# Patient Record
Sex: MALE | Race: WHITE | NOT HISPANIC OR LATINO | Employment: OTHER | URBAN - METROPOLITAN AREA
[De-identification: names, ages, dates, MRNs, and addresses within clinical notes are randomized per-mention and may not be internally consistent; named-entity substitution may affect disease eponyms.]

---

## 2021-06-06 PROCEDURE — 87635 SARS-COV-2 COVID-19 AMP PRB: CPT | Performed by: EMERGENCY MEDICINE

## 2021-06-12 ENCOUNTER — HOSPITAL ENCOUNTER (EMERGENCY)
Facility: HOSPITAL | Age: 26
Discharge: HOME | End: 2021-06-12
Attending: EMERGENCY MEDICINE | Admitting: EMERGENCY MEDICINE

## 2021-06-12 ENCOUNTER — APPOINTMENT (OUTPATIENT)
Dept: GENERAL RADIOLOGY | Facility: HOSPITAL | Age: 26
End: 2021-06-12

## 2021-06-12 VITALS
RESPIRATION RATE: 16 BRPM | HEIGHT: 74 IN | WEIGHT: 170.19 LBS | BODY MASS INDEX: 21.84 KG/M2 | SYSTOLIC BLOOD PRESSURE: 110 MMHG | OXYGEN SATURATION: 100 % | DIASTOLIC BLOOD PRESSURE: 73 MMHG | HEART RATE: 80 BPM | TEMPERATURE: 98.2 F

## 2021-06-12 DIAGNOSIS — J10.1 INFLUENZA B: Primary | ICD-10-CM

## 2021-06-12 DIAGNOSIS — Z20.822 COVID-19 VIRUS TEST RESULT UNKNOWN: ICD-10-CM

## 2021-06-12 LAB
ALBUMIN SERPL-MCNC: 4.4 G/DL (ref 3.5–5.2)
ALBUMIN/GLOB SERPL: 1.8 G/DL
ALP SERPL-CCNC: 77 U/L (ref 39–117)
ALT SERPL W P-5'-P-CCNC: 16 U/L (ref 1–41)
ANION GAP SERPL CALCULATED.3IONS-SCNC: 7.6 MMOL/L (ref 5–15)
AST SERPL-CCNC: 13 U/L (ref 1–40)
BASOPHILS # BLD AUTO: 0.02 10*3/MM3 (ref 0–0.2)
BASOPHILS NFR BLD AUTO: 0.3 % (ref 0–1.5)
BILIRUB SERPL-MCNC: 0.5 MG/DL (ref 0–1.2)
BUN SERPL-MCNC: 10 MG/DL (ref 6–20)
BUN/CREAT SERPL: 11.6 (ref 7–25)
CALCIUM SPEC-SCNC: 8.8 MG/DL (ref 8.6–10.5)
CHLORIDE SERPL-SCNC: 99 MMOL/L (ref 98–107)
CO2 SERPL-SCNC: 29.4 MMOL/L (ref 22–29)
CREAT SERPL-MCNC: 0.86 MG/DL (ref 0.76–1.27)
DEPRECATED RDW RBC AUTO: 39.5 FL (ref 37–54)
EOSINOPHIL # BLD AUTO: 0.13 10*3/MM3 (ref 0–0.4)
EOSINOPHIL NFR BLD AUTO: 1.9 % (ref 0.3–6.2)
ERYTHROCYTE [DISTWIDTH] IN BLOOD BY AUTOMATED COUNT: 12.3 % (ref 12.3–15.4)
FLUAV AG NPH QL: NEGATIVE
FLUBV AG NPH QL IA: POSITIVE
GFR SERPL CREATININE-BSD FRML MDRD: 109 ML/MIN/1.73
GLOBULIN UR ELPH-MCNC: 2.5 GM/DL
GLUCOSE SERPL-MCNC: 68 MG/DL (ref 65–99)
HCT VFR BLD AUTO: 42.3 % (ref 37.5–51)
HGB BLD-MCNC: 14.1 G/DL (ref 13–17.7)
HOLD SPECIMEN: NORMAL
HOLD SPECIMEN: NORMAL
IMM GRANULOCYTES # BLD AUTO: 0.06 10*3/MM3 (ref 0–0.05)
IMM GRANULOCYTES NFR BLD AUTO: 0.9 % (ref 0–0.5)
LYMPHOCYTES # BLD AUTO: 2.54 10*3/MM3 (ref 0.7–3.1)
LYMPHOCYTES NFR BLD AUTO: 37.6 % (ref 19.6–45.3)
MCH RBC QN AUTO: 29.4 PG (ref 26.6–33)
MCHC RBC AUTO-ENTMCNC: 33.3 G/DL (ref 31.5–35.7)
MCV RBC AUTO: 88.1 FL (ref 79–97)
MONOCYTES # BLD AUTO: 0.38 10*3/MM3 (ref 0.1–0.9)
MONOCYTES NFR BLD AUTO: 5.6 % (ref 5–12)
NEUTROPHILS NFR BLD AUTO: 3.62 10*3/MM3 (ref 1.7–7)
NEUTROPHILS NFR BLD AUTO: 53.7 % (ref 42.7–76)
NRBC BLD AUTO-RTO: 0 /100 WBC (ref 0–0.2)
NT-PROBNP SERPL-MCNC: 14.3 PG/ML (ref 0–450)
PLATELET # BLD AUTO: 300 10*3/MM3 (ref 140–450)
PMV BLD AUTO: 9.4 FL (ref 6–12)
POTASSIUM SERPL-SCNC: 4 MMOL/L (ref 3.5–5.2)
PROT SERPL-MCNC: 6.9 G/DL (ref 6–8.5)
RBC # BLD AUTO: 4.8 10*6/MM3 (ref 4.14–5.8)
SODIUM SERPL-SCNC: 136 MMOL/L (ref 136–145)
TROPONIN T SERPL-MCNC: <0.01 NG/ML (ref 0–0.03)
WBC # BLD AUTO: 6.75 10*3/MM3 (ref 3.4–10.8)
WHOLE BLOOD HOLD SPECIMEN: NORMAL

## 2021-06-12 PROCEDURE — 84484 ASSAY OF TROPONIN QUANT: CPT

## 2021-06-12 PROCEDURE — 80053 COMPREHEN METABOLIC PANEL: CPT

## 2021-06-12 PROCEDURE — 93005 ELECTROCARDIOGRAM TRACING: CPT | Performed by: EMERGENCY MEDICINE

## 2021-06-12 PROCEDURE — 99283 EMERGENCY DEPT VISIT LOW MDM: CPT

## 2021-06-12 PROCEDURE — 93005 ELECTROCARDIOGRAM TRACING: CPT

## 2021-06-12 PROCEDURE — U0003 INFECTIOUS AGENT DETECTION BY NUCLEIC ACID (DNA OR RNA); SEVERE ACUTE RESPIRATORY SYNDROME CORONAVIRUS 2 (SARS-COV-2) (CORONAVIRUS DISEASE [COVID-19]), AMPLIFIED PROBE TECHNIQUE, MAKING USE OF HIGH THROUGHPUT TECHNOLOGIES AS DESCRIBED BY CMS-2020-01-R: HCPCS | Performed by: NURSE PRACTITIONER

## 2021-06-12 PROCEDURE — 85025 COMPLETE CBC W/AUTO DIFF WBC: CPT

## 2021-06-12 PROCEDURE — 83880 ASSAY OF NATRIURETIC PEPTIDE: CPT

## 2021-06-12 PROCEDURE — 36415 COLL VENOUS BLD VENIPUNCTURE: CPT

## 2021-06-12 PROCEDURE — 87804 INFLUENZA ASSAY W/OPTIC: CPT | Performed by: NURSE PRACTITIONER

## 2021-06-12 PROCEDURE — C9803 HOPD COVID-19 SPEC COLLECT: HCPCS | Performed by: NURSE PRACTITIONER

## 2021-06-12 PROCEDURE — 71045 X-RAY EXAM CHEST 1 VIEW: CPT

## 2021-06-12 RX ORDER — SODIUM CHLORIDE 0.9 % (FLUSH) 0.9 %
10 SYRINGE (ML) INJECTION AS NEEDED
Status: DISCONTINUED | OUTPATIENT
Start: 2021-06-12 | End: 2021-06-12 | Stop reason: HOSPADM

## 2021-06-12 NOTE — ED PROVIDER NOTES
"Subjective   Patient reports that he has had shortness of breath and difficulty breathing with chest pain described as a \"bear hug\".  He reports that he went to an urgent care clinic on June 5 and was diagnosed with a sinus infection and prescribed cefdinir and prednisone.  He reports that it did fix his hoarseness but he seems like he feels worse than he did before.  He reports sharp pain in his right lower lung that changes with positioning.  He also reports cough, sneezing, body aches, nausea without vomiting, and head and chest congestion.  He reports that he has been \"in the field\" in his  job and sleeping outside.      History provided by:  Patient      Review of Systems   HENT: Positive for congestion, sinus pressure, sinus pain and sneezing.    Respiratory: Positive for cough, chest tightness (Feels like a bear hug) and shortness of breath.         Chest congestion   Gastrointestinal: Positive for nausea.   All other systems reviewed and are negative.      Past Medical History:   Diagnosis Date   • Anxiety    • Depression        No Known Allergies    History reviewed. No pertinent surgical history.    History reviewed. No pertinent family history.    Social History     Socioeconomic History   • Marital status: Single     Spouse name: Not on file   • Number of children: Not on file   • Years of education: Not on file   • Highest education level: Not on file   Tobacco Use   • Smoking status: Current Some Day Smoker     Packs/day: 0.50     Types: Cigarettes   • Smokeless tobacco: Current User     Types: Chew   Vaping Use   • Vaping Use: Some days   Substance and Sexual Activity   • Alcohol use: Not Currently   • Drug use: Never           Objective   Physical Exam  Vitals and nursing note reviewed.   Constitutional:       Appearance: He is well-developed and normal weight.   HENT:      Head: Normocephalic and atraumatic.      Mouth/Throat:      Mouth: Mucous membranes are moist.      Pharynx: Oropharynx " is clear.   Eyes:      Pupils: Pupils are equal, round, and reactive to light.   Cardiovascular:      Rate and Rhythm: Normal rate and regular rhythm.   Pulmonary:      Effort: Pulmonary effort is normal.      Breath sounds: Normal breath sounds. No decreased breath sounds, wheezing, rhonchi or rales.   Chest:      Chest wall: No deformity.   Abdominal:      General: Bowel sounds are normal.      Palpations: Abdomen is soft.      Tenderness: There is no abdominal tenderness.   Musculoskeletal:         General: Normal range of motion.      Cervical back: Normal range of motion and neck supple.   Skin:     General: Skin is warm and dry.   Neurological:      General: No focal deficit present.      Mental Status: He is alert and oriented to person, place, and time.   Psychiatric:         Mood and Affect: Mood normal.         Behavior: Behavior normal.         Procedures           ED Course  ED Course as of Jun 12 2133   Sat Jun 12, 2021 1922 Globulin: 2.5 [MH]      ED Course User Index  [MH] Michaela Sprague APRN                                           MDM  Number of Diagnoses or Management Options  COVID-19 virus test result unknown: new and requires workup  Influenza B: new and requires workup     Amount and/or Complexity of Data Reviewed  Clinical lab tests: reviewed  Tests in the radiology section of CPT®: reviewed  Tests in the medicine section of CPT®: reviewed  Independent visualization of images, tracings, or specimens: yes    Patient Progress  Patient progress: stable      Final diagnoses:   COVID-19 virus test result unknown   Influenza B       ED Disposition  ED Disposition     ED Disposition Condition Comment    Discharge Stable           [facility]  [address] [address]  [phone]      As needed         Medication List      No changes were made to your prescriptions during this visit.          Michaela Sprague, ROCIO  06/12/21 2133

## 2021-06-12 NOTE — ED NOTES
"Patient states his chest pain feels like a \"bear hug\"     Ayla-Aliya Miller, RN  06/12/21 1288    "

## 2021-06-13 LAB — SARS-COV-2 RNA RESP QL NAA+PROBE: NOT DETECTED
